# Patient Record
Sex: FEMALE | Race: WHITE | NOT HISPANIC OR LATINO | Employment: FULL TIME | URBAN - METROPOLITAN AREA
[De-identification: names, ages, dates, MRNs, and addresses within clinical notes are randomized per-mention and may not be internally consistent; named-entity substitution may affect disease eponyms.]

---

## 2022-06-24 ENCOUNTER — HOSPITAL ENCOUNTER (EMERGENCY)
Facility: HOSPITAL | Age: 27
Discharge: HOME | End: 2022-06-25
Attending: EMERGENCY MEDICINE
Payer: COMMERCIAL

## 2022-06-24 VITALS
WEIGHT: 220 LBS | RESPIRATION RATE: 18 BRPM | DIASTOLIC BLOOD PRESSURE: 80 MMHG | SYSTOLIC BLOOD PRESSURE: 127 MMHG | BODY MASS INDEX: 37.56 KG/M2 | HEIGHT: 64 IN | OXYGEN SATURATION: 98 % | TEMPERATURE: 99 F | HEART RATE: 102 BPM

## 2022-06-24 DIAGNOSIS — R68.89 FLU-LIKE SYMPTOMS: Primary | ICD-10-CM

## 2022-06-24 DIAGNOSIS — R11.2 NON-INTRACTABLE VOMITING WITH NAUSEA, UNSPECIFIED VOMITING TYPE: ICD-10-CM

## 2022-06-24 LAB
B-HCG UR QL: NEGATIVE
BILIRUB UR QL STRIP: NEGATIVE
CLARITY UR: CLEAR
COLOR UR: YELLOW
GLUCOSE UR QL STRIP: NEGATIVE
HGB UR QL STRIP: NEGATIVE
INFLUENZA A, MOLECULAR: NEGATIVE
INFLUENZA B, MOLECULAR: NEGATIVE
KETONES UR QL STRIP: NEGATIVE
LEUKOCYTE ESTERASE UR QL STRIP: NEGATIVE
NITRITE UR QL STRIP: NEGATIVE
PH UR STRIP: 8 [PH] (ref 5–8)
PROT UR QL STRIP: ABNORMAL
SARS-COV-2 RDRP RESP QL NAA+PROBE: NEGATIVE
SP GR UR STRIP: 1.02 (ref 1–1.03)
SPECIMEN SOURCE: NORMAL
URN SPEC COLLECT METH UR: ABNORMAL
UROBILINOGEN UR STRIP-ACNC: >=8 EU/DL

## 2022-06-24 PROCEDURE — 99283 EMERGENCY DEPT VISIT LOW MDM: CPT

## 2022-06-24 PROCEDURE — 87502 INFLUENZA DNA AMP PROBE: CPT | Performed by: EMERGENCY MEDICINE

## 2022-06-24 PROCEDURE — 81000 URINALYSIS NONAUTO W/SCOPE: CPT | Performed by: EMERGENCY MEDICINE

## 2022-06-24 PROCEDURE — 25000003 PHARM REV CODE 250: Performed by: EMERGENCY MEDICINE

## 2022-06-24 PROCEDURE — 81025 URINE PREGNANCY TEST: CPT | Performed by: EMERGENCY MEDICINE

## 2022-06-24 PROCEDURE — U0002 COVID-19 LAB TEST NON-CDC: HCPCS | Performed by: EMERGENCY MEDICINE

## 2022-06-24 RX ORDER — ONDANSETRON 4 MG/1
4 TABLET, ORALLY DISINTEGRATING ORAL
Status: COMPLETED | OUTPATIENT
Start: 2022-06-24 | End: 2022-06-24

## 2022-06-24 RX ADMIN — ONDANSETRON 4 MG: 4 TABLET, ORALLY DISINTEGRATING ORAL at 11:06

## 2022-06-25 LAB
BACTERIA #/AREA URNS HPF: ABNORMAL /HPF
HYALINE CASTS #/AREA URNS LPF: 3 /LPF
MICROSCOPIC COMMENT: ABNORMAL
RBC #/AREA URNS HPF: 1 /HPF (ref 0–4)
SQUAMOUS #/AREA URNS HPF: 6 /HPF
WBC #/AREA URNS HPF: 2 /HPF (ref 0–5)

## 2022-06-25 RX ORDER — ONDANSETRON 4 MG/1
4 TABLET, ORALLY DISINTEGRATING ORAL EVERY 6 HOURS PRN
Qty: 12 TABLET | Refills: 0 | Status: SHIPPED | OUTPATIENT
Start: 2022-06-25

## 2022-06-25 NOTE — ED PROVIDER NOTES
Encounter Date: 2022       History     Chief Complaint   Patient presents with    General Illness     Tired, generalized malaise, fever in the evening.      26-year-old female here complaining of 5 day history of not feeling well in general, subjective fevers, mild sore throat, nonproductive cough, bilateral lower abdominal pain which sometimes radiates to the back, nausea, and mild urinary frequency.  She had 1 episode of loose stools.  She denies any known sick contacts.  She has been taking over-the-counter medications for her symptoms without relief.  She states she does not have a primary care doctor.        Review of patient's allergies indicates:  No Known Allergies  Past Medical History:   Diagnosis Date    Anxiety disorder, unspecified     Depression      Past Surgical History:   Procedure Laterality Date    APPENDECTOMY       SECTION      x2     History reviewed. No pertinent family history.  Social History     Tobacco Use    Smoking status: Never Smoker    Smokeless tobacco: Never Used   Substance Use Topics    Alcohol use: Never    Drug use: Not Currently     Review of Systems   Constitutional: Positive for chills and fever (Subjective).   HENT: Negative for congestion and sore throat.    Respiratory: Positive for cough. Negative for shortness of breath.    Gastrointestinal: Positive for abdominal pain, diarrhea and nausea. Negative for vomiting.   Genitourinary: Positive for frequency. Negative for difficulty urinating, dysuria, flank pain, hematuria, vaginal bleeding and vaginal discharge.   Musculoskeletal: Positive for back pain (Occasional lower back pain). Negative for arthralgias, myalgias, neck pain and neck stiffness.   Skin: Negative for pallor and rash.   Neurological: Negative for dizziness, syncope, weakness, light-headedness and headaches.   Psychiatric/Behavioral: The patient is not nervous/anxious.        Physical Exam     Initial Vitals [22 2117]   BP Pulse  Resp Temp SpO2   127/80 102 18 98.6 °F (37 °C) 98 %      MAP       --         Physical Exam    Constitutional: She appears well-developed and well-nourished. She is not diaphoretic. No distress.   HENT:   Head: Normocephalic and atraumatic.   Right Ear: External ear normal.   Left Ear: External ear normal.   Nose: Nose normal.   Mouth/Throat: Oropharynx is clear and moist. No oropharyngeal exudate.   Eyes: Conjunctivae and EOM are normal. Pupils are equal, round, and reactive to light. No scleral icterus.   Neck: Neck supple. No JVD present.   Normal range of motion.  Cardiovascular: Normal rate, regular rhythm, normal heart sounds and intact distal pulses.   No murmur heard.  Pulmonary/Chest: Breath sounds normal. No stridor. No respiratory distress. She has no wheezes. She has no rhonchi. She has no rales.   Abdominal: Abdomen is soft. Bowel sounds are normal. She exhibits no distension and no mass. There is no abdominal tenderness. There is no rebound and no guarding.   Musculoskeletal:         General: No tenderness or edema. Normal range of motion.      Cervical back: Normal range of motion and neck supple.     Neurological: She is alert and oriented to person, place, and time. She has normal strength. No cranial nerve deficit or sensory deficit. GCS score is 15. GCS eye subscore is 4. GCS verbal subscore is 5. GCS motor subscore is 6.   Skin: Skin is warm and dry. Capillary refill takes less than 2 seconds. No rash noted. No erythema.   Psychiatric: She has a normal mood and affect. Her behavior is normal.         ED Course   Procedures  Labs Reviewed   URINALYSIS, REFLEX TO URINE CULTURE - Abnormal; Notable for the following components:       Result Value    Protein, UA 1+ (*)     Urobilinogen, UA >=8.0 (*)     All other components within normal limits    Narrative:     Preferred Collection Type->Urine, Clean Catch  Specimen Source->Urine   URINALYSIS MICROSCOPIC - Abnormal; Notable for the following  components:    Hyaline Casts, UA 3 (*)     All other components within normal limits    Narrative:     Preferred Collection Type->Urine, Clean Catch  Specimen Source->Urine   INFLUENZA A & B BY MOLECULAR   SARS-COV-2 RNA AMPLIFICATION, QUAL    Narrative:     Is the patient symptomatic?->Yes   PREGNANCY TEST, URINE RAPID    Narrative:     Specimen Source->Urine          Imaging Results    None          Medications   ondansetron disintegrating tablet 4 mg (4 mg Oral Given 6/24/22 0863)     Medical Decision Making:   Differential Diagnosis:   COVID-19, influenza, other viral illness, urinary tract infection, pregnancy, ectopic pregnancy, etc.  ED Management:  Patient is negative for flu, negative for COVID-19.  No evidence of UTI.  Pregnancy test negative.  Patient appears to be suffering from a viral illness.  Will provide a prescription for Zofran, and she will use use over-the-counter medications for other symptoms.  She will follow-up with her PCP, and return here as needed or if worse in any way.  Patient initially told me that she has no primary care provider so offered a referral to family practice but she stated that she has plans to see a specific provider within the next week.  She will return here as needed or if worse in any way.                      Clinical Impression:   Final diagnoses:  [R11.2] Non-intractable vomiting with nausea, unspecified vomiting type  [R68.89] Flu-like symptoms (Primary)          ED Disposition Condition    Discharge Stable        ED Prescriptions     Medication Sig Dispense Start Date End Date Auth. Provider    ondansetron (ZOFRAN-ODT) 4 MG TbDL Take 1 tablet (4 mg total) by mouth every 6 (six) hours as needed (Nausea). 12 tablet 6/25/2022  Fede Rodarte MD        Follow-up Information     Follow up With Specialties Details Why Contact Info    Your primary care provider  In 3 days      [facility] Emergency [facility]  If symptoms worsen [address] [facility]  [facility] [id number]  [phone]           Fede Rodarte MD  06/25/22 0114

## 2022-06-25 NOTE — DISCHARGE INSTRUCTIONS
Take over-the-counter Tylenol and Motrin for discomfort, and take Zofran as needed for nausea.  If you develop loose stools, take over-the-counter Imodium.  Consider a mild stool softener for any constipation.  Drink plenty of liquids, and follow-up with your primary care provider.  Return here as needed or if worse in any way.